# Patient Record
Sex: FEMALE | Race: WHITE | URBAN - METROPOLITAN AREA
[De-identification: names, ages, dates, MRNs, and addresses within clinical notes are randomized per-mention and may not be internally consistent; named-entity substitution may affect disease eponyms.]

---

## 2018-07-01 ENCOUNTER — ED HISTORICAL/CONVERTED ENCOUNTER (OUTPATIENT)
Dept: OTHER | Age: 28
End: 2018-07-01

## 2021-10-28 ENCOUNTER — APPOINTMENT (OUTPATIENT)
Dept: CT IMAGING | Age: 31
DRG: 917 | End: 2021-10-28
Attending: EMERGENCY MEDICINE
Payer: COMMERCIAL

## 2021-10-28 ENCOUNTER — HOSPITAL ENCOUNTER (INPATIENT)
Age: 31
LOS: 2 days | Discharge: HOME | DRG: 917 | End: 2021-10-30
Attending: EMERGENCY MEDICINE | Admitting: INTERNAL MEDICINE
Payer: COMMERCIAL

## 2021-10-28 DIAGNOSIS — G93.40 ACUTE ENCEPHALOPATHY: ICD-10-CM

## 2021-10-28 DIAGNOSIS — N30.00 ACUTE CYSTITIS WITHOUT HEMATURIA: ICD-10-CM

## 2021-10-28 DIAGNOSIS — R41.3 AMNESIA MEMORY LOSS: Primary | ICD-10-CM

## 2021-10-28 DIAGNOSIS — R40.4 TRANSIENT ALTERATION OF AWARENESS: ICD-10-CM

## 2021-10-28 LAB
ALBUMIN SERPL-MCNC: 4 G/DL (ref 3.5–5)
ALBUMIN/GLOB SERPL: 0.9 {RATIO} (ref 1.1–2.2)
ALP SERPL-CCNC: 76 U/L (ref 45–117)
ALT SERPL-CCNC: 21 U/L (ref 12–78)
AMPHET UR QL SCN: NEGATIVE
ANION GAP SERPL CALC-SCNC: 4 MMOL/L (ref 5–15)
APAP SERPL-MCNC: <2 UG/ML (ref 10–30)
APPEARANCE UR: CLEAR
AST SERPL-CCNC: 9 U/L (ref 15–37)
BACTERIA URNS QL MICRO: ABNORMAL /HPF
BARBITURATES UR QL SCN: NEGATIVE
BASOPHILS # BLD: 0.1 K/UL (ref 0–0.1)
BASOPHILS NFR BLD: 1 % (ref 0–1)
BENZODIAZ UR QL: NEGATIVE
BILIRUB SERPL-MCNC: 0.5 MG/DL (ref 0.2–1)
BILIRUB UR QL: NEGATIVE
BUN SERPL-MCNC: 9 MG/DL (ref 6–20)
BUN/CREAT SERPL: 10 (ref 12–20)
CALCIUM SERPL-MCNC: 9.4 MG/DL (ref 8.5–10.1)
CANNABINOIDS UR QL SCN: POSITIVE
CHLORIDE SERPL-SCNC: 106 MMOL/L (ref 97–108)
CO2 SERPL-SCNC: 25 MMOL/L (ref 21–32)
COCAINE UR QL SCN: NEGATIVE
COLOR UR: ABNORMAL
COMMENT, HOLDF: NORMAL
CREAT SERPL-MCNC: 0.93 MG/DL (ref 0.55–1.02)
DIFFERENTIAL METHOD BLD: ABNORMAL
DRUG SCRN COMMENT,DRGCM: ABNORMAL
EOSINOPHIL # BLD: 0 K/UL (ref 0–0.4)
EOSINOPHIL NFR BLD: 0 % (ref 0–7)
EPITH CASTS URNS QL MICRO: ABNORMAL /LPF
ERYTHROCYTE [DISTWIDTH] IN BLOOD BY AUTOMATED COUNT: 12.6 % (ref 11.5–14.5)
ETHANOL SERPL-MCNC: <10 MG/DL
GLOBULIN SER CALC-MCNC: 4.3 G/DL (ref 2–4)
GLUCOSE SERPL-MCNC: 100 MG/DL (ref 65–100)
GLUCOSE UR STRIP.AUTO-MCNC: NEGATIVE MG/DL
HCT VFR BLD AUTO: 41.2 % (ref 35–47)
HGB BLD-MCNC: 13.6 G/DL (ref 11.5–16)
HGB UR QL STRIP: NEGATIVE
IMM GRANULOCYTES # BLD AUTO: 0 K/UL (ref 0–0.04)
IMM GRANULOCYTES NFR BLD AUTO: 0 % (ref 0–0.5)
KETONES UR QL STRIP.AUTO: 80 MG/DL
LEUKOCYTE ESTERASE UR QL STRIP.AUTO: NEGATIVE
LYMPHOCYTES # BLD: 2.4 K/UL (ref 0.8–3.5)
LYMPHOCYTES NFR BLD: 18 % (ref 12–49)
MCH RBC QN AUTO: 29.2 PG (ref 26–34)
MCHC RBC AUTO-ENTMCNC: 33 G/DL (ref 30–36.5)
MCV RBC AUTO: 88.6 FL (ref 80–99)
METHADONE UR QL: NEGATIVE
MONOCYTES # BLD: 0.9 K/UL (ref 0–1)
MONOCYTES NFR BLD: 7 % (ref 5–13)
NEUTS SEG # BLD: 9.4 K/UL (ref 1.8–8)
NEUTS SEG NFR BLD: 74 % (ref 32–75)
NITRITE UR QL STRIP.AUTO: POSITIVE
NRBC # BLD: 0 K/UL (ref 0–0.01)
NRBC BLD-RTO: 0 PER 100 WBC
OPIATES UR QL: NEGATIVE
PCP UR QL: NEGATIVE
PH UR STRIP: 7 [PH] (ref 5–8)
PLATELET # BLD AUTO: 352 K/UL (ref 150–400)
PMV BLD AUTO: 9.8 FL (ref 8.9–12.9)
POTASSIUM SERPL-SCNC: 3.5 MMOL/L (ref 3.5–5.1)
PROT SERPL-MCNC: 8.3 G/DL (ref 6.4–8.2)
PROT UR STRIP-MCNC: NEGATIVE MG/DL
RBC # BLD AUTO: 4.65 M/UL (ref 3.8–5.2)
RBC #/AREA URNS HPF: ABNORMAL /HPF (ref 0–5)
SALICYLATES SERPL-MCNC: <1.7 MG/DL (ref 2.8–20)
SAMPLES BEING HELD,HOLD: NORMAL
SODIUM SERPL-SCNC: 135 MMOL/L (ref 136–145)
SP GR UR REFRACTOMETRY: 1.02 (ref 1–1.03)
UR CULT HOLD, URHOLD: NORMAL
UROBILINOGEN UR QL STRIP.AUTO: 0.2 EU/DL (ref 0.2–1)
WBC # BLD AUTO: 12.8 K/UL (ref 3.6–11)
WBC URNS QL MICRO: ABNORMAL /HPF (ref 0–4)

## 2021-10-28 PROCEDURE — 99285 EMERGENCY DEPT VISIT HI MDM: CPT

## 2021-10-28 PROCEDURE — 87086 URINE CULTURE/COLONY COUNT: CPT

## 2021-10-28 PROCEDURE — 74011250637 HC RX REV CODE- 250/637: Performed by: EMERGENCY MEDICINE

## 2021-10-28 PROCEDURE — 65660000000 HC RM CCU STEPDOWN

## 2021-10-28 PROCEDURE — 80179 DRUG ASSAY SALICYLATE: CPT

## 2021-10-28 PROCEDURE — 70450 CT HEAD/BRAIN W/O DYE: CPT

## 2021-10-28 PROCEDURE — 80143 DRUG ASSAY ACETAMINOPHEN: CPT

## 2021-10-28 PROCEDURE — 36415 COLL VENOUS BLD VENIPUNCTURE: CPT

## 2021-10-28 PROCEDURE — 80053 COMPREHEN METABOLIC PANEL: CPT

## 2021-10-28 PROCEDURE — 82077 ASSAY SPEC XCP UR&BREATH IA: CPT

## 2021-10-28 PROCEDURE — 87077 CULTURE AEROBIC IDENTIFY: CPT

## 2021-10-28 PROCEDURE — 85025 COMPLETE CBC W/AUTO DIFF WBC: CPT

## 2021-10-28 PROCEDURE — 87186 SC STD MICRODIL/AGAR DIL: CPT

## 2021-10-28 PROCEDURE — 81001 URINALYSIS AUTO W/SCOPE: CPT

## 2021-10-28 PROCEDURE — 80307 DRUG TEST PRSMV CHEM ANLYZR: CPT

## 2021-10-28 RX ORDER — ONDANSETRON 2 MG/ML
4 INJECTION INTRAMUSCULAR; INTRAVENOUS
Status: DISCONTINUED | OUTPATIENT
Start: 2021-10-28 | End: 2021-10-30 | Stop reason: HOSPADM

## 2021-10-28 RX ORDER — SODIUM CHLORIDE, SODIUM LACTATE, POTASSIUM CHLORIDE, CALCIUM CHLORIDE 600; 310; 30; 20 MG/100ML; MG/100ML; MG/100ML; MG/100ML
50 INJECTION, SOLUTION INTRAVENOUS CONTINUOUS
Status: DISCONTINUED | OUTPATIENT
Start: 2021-10-28 | End: 2021-10-30 | Stop reason: HOSPADM

## 2021-10-28 RX ORDER — NITROFURANTOIN 25; 75 MG/1; MG/1
100 CAPSULE ORAL
Status: COMPLETED | OUTPATIENT
Start: 2021-10-28 | End: 2021-10-28

## 2021-10-28 RX ORDER — ACETAMINOPHEN 650 MG/1
650 SUPPOSITORY RECTAL
Status: DISCONTINUED | OUTPATIENT
Start: 2021-10-28 | End: 2021-10-30 | Stop reason: HOSPADM

## 2021-10-28 RX ORDER — POLYETHYLENE GLYCOL 3350 17 G/17G
17 POWDER, FOR SOLUTION ORAL DAILY PRN
Status: DISCONTINUED | OUTPATIENT
Start: 2021-10-28 | End: 2021-10-30 | Stop reason: HOSPADM

## 2021-10-28 RX ORDER — ONDANSETRON 4 MG/1
4 TABLET, ORALLY DISINTEGRATING ORAL
Status: DISCONTINUED | OUTPATIENT
Start: 2021-10-28 | End: 2021-10-30 | Stop reason: HOSPADM

## 2021-10-28 RX ORDER — ACETAMINOPHEN 325 MG/1
650 TABLET ORAL
Status: DISCONTINUED | OUTPATIENT
Start: 2021-10-28 | End: 2021-10-30 | Stop reason: HOSPADM

## 2021-10-28 RX ORDER — SODIUM CHLORIDE 0.9 % (FLUSH) 0.9 %
5-40 SYRINGE (ML) INJECTION EVERY 8 HOURS
Status: DISCONTINUED | OUTPATIENT
Start: 2021-10-28 | End: 2021-10-30 | Stop reason: HOSPADM

## 2021-10-28 RX ORDER — SODIUM CHLORIDE 0.9 % (FLUSH) 0.9 %
5-40 SYRINGE (ML) INJECTION AS NEEDED
Status: DISCONTINUED | OUTPATIENT
Start: 2021-10-28 | End: 2021-10-30 | Stop reason: HOSPADM

## 2021-10-28 RX ADMIN — Medication 10 ML: at 23:02

## 2021-10-28 RX ADMIN — NITROFURANTOIN (MONOHYDRATE/MACROCRYSTALS) 100 MG: 75; 25 CAPSULE ORAL at 19:21

## 2021-10-28 NOTE — BSMART NOTE
Patient arrived here by squad for complaints of memory loss. Patient knows her name but does not know exactly where she lives, what she does for a living, if she has children, or if she is . Per triage she was aware of month and year. Patient does not know who called the ambulance. ER Doctor spoke with patient's boyfriend, Hiro Cortes, at [phone]. He indicated she has history of UTI and low iron. He reported she began having difficulty with memory yesterday and came home from work unable to remember what she did. She told her boyfriend she got home with help of GPS. Per boyfriend there has been increased work stress. Patient is alert and oriented to person but not place, time, or situation entirely. Patient was asked where she lived and she said \"I have a cheat sheet in my pocket. \"  She could only recall [place]. Patient is not sure if she has ever had this happen before and stated she doesn't know if she has any history of drug use/abuse. Patient reported she doesn't think she has mental health issues but isn't sure. Patient stated she cant recall if she is eating or sleeping but feels hungry. She denied hallucinations. Patient also denied adamantly any suicidal or homicidal ideation or history of same. Patient reported she is employed but not sure in what capacity and unsure if she is  or if she has children. Patient is unsure of her level of education but reported \"[employer] is popping up in my head. \"      Discussed case with Dr Patricia Ashby who advised so far no medical cause for the memory loss. Mom is reportedly coming here from [address] and coming to hospital.  Will speak with her on arrival.    Mother has arrived. She stated that 2 days ago patient was in her normal state of health but was anxious about a conversation she needed to have with her boyfriend of 11 years and if it didn't go the way she expected possibly considering a break up.   Mom is unsure if this conversation happened as is patient. Patient has always been anxious per mother but not treated for any mental health conditions. Patient recalls mother and other family members in talking with mother. UDS is positive for THC. Per Dr Tito Munoz other than UTI, patient is medically cleared. This writer spoke with Kaiden Etienne NP for Psychiatry who does not recommend psychiatric admission at this time. She recommends neurological consult and admission if appropriate with psychiatric consult. [place] Suicide Scale No risk per patient.

## 2021-10-28 NOTE — ED TRIAGE NOTES
Pt arrives via ems with c/o memory loss. Pt is having trouble with short term memory and some long term memories. Is able to state name, state, month, year. Does not know answers to many questions.

## 2021-10-28 NOTE — ED PROVIDER NOTES
The history is provided by the patient. Memory Loss   This is a new problem. The current episode started yesterday. The problem has been gradually worsening. No past medical history on file. No past surgical history on file. No family history on file. Social History     Socioeconomic History    Marital status: SINGLE     Spouse name: Not on file    Number of children: Not on file    Years of education: Not on file    Highest education level: Not on file   Occupational History    Not on file   Tobacco Use    Smoking status: Not on file   Substance and Sexual Activity    Alcohol use: Not on file    Drug use: Not on file    Sexual activity: Not on file   Other Topics Concern    Not on file   Social History Narrative    Not on file     Social Determinants of Health     Financial Resource Strain:     Difficulty of Paying Living Expenses:    Food Insecurity:     Worried About Running Out of Food in the Last Year:     [address] in the Last Year:    Transportation Needs:     Lack of Transportation (Medical):  Lack of Transportation (Non-Medical):    Physical Activity:     Days of Exercise per Week:     Minutes of Exercise per Session:    Stress:     Feeling of Stress :    Social Connections:     Frequency of Communication with Friends and Family:     Frequency of Social Gatherings with Friends and Family:     Attends [employer] Services:     Active Member of Clubs or Organizations:     Attends Club or Organization Meetings:     Marital Status:    Intimate Partner Violence:     Fear of Current or Ex-Partner:     Emotionally Abused:     Physically Abused:     Sexually Abused: ALLERGIES: Patient has no known allergies. Review of Systems   Unable to perform ROS: Mental status change   Psychiatric/Behavioral: Positive for memory loss.        Vitals:    10/28/21 1600   BP: (!) 148/104   Pulse: 93   Resp: 16   Temp: 99 °F (37.2 °C)   SpO2: 99%   Weight: 83.9 kg (184 lb 15.5 oz)            Physical Exam  Vitals and nursing note reviewed. Constitutional:       General: She is not in acute distress. Appearance: She is well-developed. She is not diaphoretic. HENT:      Head: Atraumatic. Neck:      Trachea: No tracheal deviation. Cardiovascular:      Comments: Warm and well perfused  Pulmonary:      Effort: Pulmonary effort is normal. No respiratory distress. Musculoskeletal:         General: Normal range of motion. Skin:     General: Skin is warm and dry. Neurological:      Mental Status: She is alert. Coordination: Coordination normal.   Psychiatric:         Behavior: Behavior normal.         Thought Content: Thought content normal.         Cognition and Memory: Memory is impaired. Judgment: Judgment normal.          MDM     This is a 18-year-old female with past medical history, review of systems, physical exam as above, presenting with complaints of amnesia. Patient without family at bedside. 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 She thinks that memory problems developed yesterday. She has a very limited review of systems and recall. 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 She is unclear as to whether she uses tobacco, alcohol or drugs. Chart reviewed without significant past medical history. Physical exam is remarkable for well-appearing young female, in no acute distress with a nonfocal neurologic exam, clear breath sounds, regular rate and rhythm without murmurs gallops or rubs. She is noted to be mildly hypertensive, afebrile without tachycardia, satting well on room air. She is under the impression her boyfriend followed the ambulance to the emergency department.   We will start with basic lab work, UA, UDS, acetaminophen, salicylates, blood alcohol and head CT. We will attempt to gather additional information, disposition pending. Procedures    Perfect Serve Consult for Admission  7:14 PM    ED Room Number: ER22/22  Patient Name and age:  Chris Benton 27 y.o.  female  Working Diagnosis:   1. Amnesia memory loss    2.  Acute cystitis without hematuria        COVID-19 Suspicion:  no  Sepsis present:  no  Reassessment needed: no  Code Status:  Full Code  Readmission: no  Isolation Requirements:  no  Recommended Level of Care:  med/surg  Department:[facility] Adult ED - 21   Other:  D/w Dr. Crispin Hunt

## 2021-10-29 ENCOUNTER — APPOINTMENT (OUTPATIENT)
Dept: GENERAL RADIOLOGY | Age: 31
DRG: 917 | End: 2021-10-29
Attending: INTERNAL MEDICINE
Payer: COMMERCIAL

## 2021-10-29 ENCOUNTER — APPOINTMENT (OUTPATIENT)
Dept: MRI IMAGING | Age: 31
DRG: 917 | End: 2021-10-29
Attending: INTERNAL MEDICINE
Payer: COMMERCIAL

## 2021-10-29 PROBLEM — G93.40 ACUTE ENCEPHALOPATHY: Status: ACTIVE | Noted: 2021-10-28

## 2021-10-29 LAB
ALBUMIN SERPL-MCNC: 3.6 G/DL (ref 3.5–5)
ALBUMIN/GLOB SERPL: 1 {RATIO} (ref 1.1–2.2)
ALP SERPL-CCNC: 69 U/L (ref 45–117)
ALT SERPL-CCNC: 17 U/L (ref 12–78)
ANION GAP SERPL CALC-SCNC: 4 MMOL/L (ref 5–15)
AST SERPL-CCNC: 8 U/L (ref 15–37)
BASOPHILS # BLD: 0 K/UL (ref 0–0.1)
BASOPHILS NFR BLD: 0 % (ref 0–1)
BILIRUB SERPL-MCNC: 0.3 MG/DL (ref 0.2–1)
BUN SERPL-MCNC: 8 MG/DL (ref 6–20)
BUN/CREAT SERPL: 13 (ref 12–20)
CALCIUM SERPL-MCNC: 9.3 MG/DL (ref 8.5–10.1)
CHLORIDE SERPL-SCNC: 109 MMOL/L (ref 97–108)
CO2 SERPL-SCNC: 24 MMOL/L (ref 21–32)
CREAT SERPL-MCNC: 0.61 MG/DL (ref 0.55–1.02)
CRP SERPL-MCNC: 0.59 MG/DL (ref 0–0.6)
D DIMER PPP FEU-MCNC: <0.19 MG/L FEU (ref 0–0.65)
DIFFERENTIAL METHOD BLD: ABNORMAL
EOSINOPHIL # BLD: 0.1 K/UL (ref 0–0.4)
EOSINOPHIL NFR BLD: 0 % (ref 0–7)
ERYTHROCYTE [DISTWIDTH] IN BLOOD BY AUTOMATED COUNT: 13 % (ref 11.5–14.5)
GLOBULIN SER CALC-MCNC: 3.5 G/DL (ref 2–4)
GLUCOSE SERPL-MCNC: 83 MG/DL (ref 65–100)
HCT VFR BLD AUTO: 37.8 % (ref 35–47)
HGB BLD-MCNC: 12.9 G/DL (ref 11.5–16)
IMM GRANULOCYTES # BLD AUTO: 0 K/UL (ref 0–0.04)
IMM GRANULOCYTES NFR BLD AUTO: 0 % (ref 0–0.5)
LACTATE SERPL-SCNC: 0.9 MMOL/L (ref 0.4–2)
LYMPHOCYTES # BLD: 2.8 K/UL (ref 0.8–3.5)
LYMPHOCYTES NFR BLD: 25 % (ref 12–49)
MAGNESIUM SERPL-MCNC: 2.3 MG/DL (ref 1.6–2.4)
MCH RBC QN AUTO: 30.1 PG (ref 26–34)
MCHC RBC AUTO-ENTMCNC: 34.1 G/DL (ref 30–36.5)
MCV RBC AUTO: 88.3 FL (ref 80–99)
MONOCYTES # BLD: 0.8 K/UL (ref 0–1)
MONOCYTES NFR BLD: 7 % (ref 5–13)
NEUTS SEG # BLD: 7.4 K/UL (ref 1.8–8)
NEUTS SEG NFR BLD: 68 % (ref 32–75)
NRBC # BLD: 0 K/UL (ref 0–0.01)
NRBC BLD-RTO: 0 PER 100 WBC
PHOSPHATE SERPL-MCNC: 3.9 MG/DL (ref 2.6–4.7)
PLATELET # BLD AUTO: 338 K/UL (ref 150–400)
PMV BLD AUTO: 10.1 FL (ref 8.9–12.9)
POTASSIUM SERPL-SCNC: 4.3 MMOL/L (ref 3.5–5.1)
PROT SERPL-MCNC: 7.1 G/DL (ref 6.4–8.2)
RBC # BLD AUTO: 4.28 M/UL (ref 3.8–5.2)
SODIUM SERPL-SCNC: 137 MMOL/L (ref 136–145)
TSH SERPL DL<=0.05 MIU/L-ACNC: 3.36 UIU/ML (ref 0.36–3.74)
WBC # BLD AUTO: 11.1 K/UL (ref 3.6–11)

## 2021-10-29 PROCEDURE — 86140 C-REACTIVE PROTEIN: CPT

## 2021-10-29 PROCEDURE — 85025 COMPLETE CBC W/AUTO DIFF WBC: CPT

## 2021-10-29 PROCEDURE — 95816 EEG AWAKE AND DROWSY: CPT | Performed by: PSYCHIATRY & NEUROLOGY

## 2021-10-29 PROCEDURE — 65660000000 HC RM CCU STEPDOWN

## 2021-10-29 PROCEDURE — 84443 ASSAY THYROID STIM HORMONE: CPT

## 2021-10-29 PROCEDURE — 84100 ASSAY OF PHOSPHORUS: CPT

## 2021-10-29 PROCEDURE — 74011250636 HC RX REV CODE- 250/636: Performed by: INTERNAL MEDICINE

## 2021-10-29 PROCEDURE — 99254 IP/OBS CNSLTJ NEW/EST MOD 60: CPT | Performed by: PSYCHIATRY & NEUROLOGY

## 2021-10-29 PROCEDURE — 74011250637 HC RX REV CODE- 250/637: Performed by: INTERNAL MEDICINE

## 2021-10-29 PROCEDURE — 83735 ASSAY OF MAGNESIUM: CPT

## 2021-10-29 PROCEDURE — 85379 FIBRIN DEGRADATION QUANT: CPT

## 2021-10-29 PROCEDURE — 83605 ASSAY OF LACTIC ACID: CPT

## 2021-10-29 PROCEDURE — 80053 COMPREHEN METABOLIC PANEL: CPT

## 2021-10-29 PROCEDURE — 70551 MRI BRAIN STEM W/O DYE: CPT

## 2021-10-29 PROCEDURE — 71045 X-RAY EXAM CHEST 1 VIEW: CPT

## 2021-10-29 PROCEDURE — 36415 COLL VENOUS BLD VENIPUNCTURE: CPT

## 2021-10-29 PROCEDURE — 74011000250 HC RX REV CODE- 250: Performed by: INTERNAL MEDICINE

## 2021-10-29 RX ADMIN — SODIUM CHLORIDE, POTASSIUM CHLORIDE, SODIUM LACTATE AND CALCIUM CHLORIDE 125 ML/HR: 600; 310; 30; 20 INJECTION, SOLUTION INTRAVENOUS at 00:25

## 2021-10-29 RX ADMIN — SODIUM CHLORIDE, POTASSIUM CHLORIDE, SODIUM LACTATE AND CALCIUM CHLORIDE 125 ML/HR: 600; 310; 30; 20 INJECTION, SOLUTION INTRAVENOUS at 10:15

## 2021-10-29 RX ADMIN — Medication 10 ML: at 06:00

## 2021-10-29 RX ADMIN — CEFTRIAXONE SODIUM 1 G: 1 INJECTION, POWDER, FOR SOLUTION INTRAMUSCULAR; INTRAVENOUS at 00:18

## 2021-10-29 RX ADMIN — Medication 10 ML: at 17:44

## 2021-10-29 RX ADMIN — Medication 1 CAPSULE: at 10:04

## 2021-10-29 RX ADMIN — CEFTRIAXONE SODIUM 1 G: 1 INJECTION, POWDER, FOR SOLUTION INTRAMUSCULAR; INTRAVENOUS at 23:32

## 2021-10-29 RX ADMIN — Medication 10 ML: at 22:00

## 2021-10-29 NOTE — ED NOTES
Pt mother, Katerine Hernandes, left her number, [phone], to be called with any updates or pt needs.

## 2021-10-29 NOTE — ED NOTES
TRANSFER - OUT REPORT:    Verbal report given to Ector Whalen RN(name) on [facility]  being transferred to 6S NSTU(unit) for routine progression of care       Report consisted of patients Situation, Background, Assessment and   Recommendations(SBAR). Information from the following report(s) SBAR was reviewed with the receiving nurse. Lines:   Peripheral IV 10/28/21 Right; Anterior; Outer (Active)        Opportunity for questions and clarification was provided.       Patient transported with:   [employer]

## 2021-10-29 NOTE — PROGRESS NOTES
Problem: Altered Thought Process (Adult/Pediatric)  Goal: Interventions  Outcome: Progressing Towards Goal     Problem: Patient Education: Go to Patient Education Activity  Goal: Patient/Family Education  Outcome: Progressing Towards Goal

## 2021-10-29 NOTE — H&P
[address]  HISTORY AND PHYSICAL    Name:  Moses Lizarraga  MR#:  [id number]  :  1990  ACCOUNT #:  [de-identified]  ADMIT DATE:  10/28/2021      The patient was seen, evaluated and admitted by me on 10/28/2021. PRIMARY CARE PHYSICIAN:  None. SOURCE OF INFORMATION:  The patient who is not a good historian because of memory problems. Review of ED electronic medical records. CHIEF COMPLAINT:  Memory loss. HISTORY OF PRESENT ILLNESS:  This is a 26-year-old woman with no significant past medical history who was in her usual state of health until the day of her presentation at the emergency room when the patient developed sudden onset of memory loss. The patient is unable to state time and day of the month. The patient is not able to answer simple questions. The patient kept saying \"I don't remember\" to every question. There was no history of fever, no rigors, no chills. No record of prior admission to this hospital.  CT scan of the head done on arrival at the emergency room was negative for acute pathology. The patient was subsequently referred to the hospitalist service for evaluation for admission. It is not clear whether this patient has had similar episodes in the past since she is not able to provide any history and also it is not clear whether the patient has history of seizure disorder. PAST MEDICAL HISTORY:  Not significant. ALLERGIES:  NO KNOWN DRUG ALLERGIES. MEDICATIONS:  No home medication reported. FAMILY HISTORY:  Unable to obtain because of the patient's memory loss. PAST SURGICAL HISTORY:  No surgery in the past was reported. SOCIAL HISTORY:  There was no history of alcohol or tobacco abuse reported. REVIEW OF SYSTEMS:  Unable to obtain because of the patient's memory loss. PHYSICAL EXAMINATION:  GENERAL APPEARANCE:  The patient appeared ill, in moderate distress.   VITAL SIGNS:  On arrival at the emergency room; temperature 99, pulse 93, respiratory rate 16, blood pressure 148/104, oxygen saturation 99% on room air. HEENT:  Head:  Normocephalic, atraumatic. Eyes:  Normal eye movement. No redness, no drainage, no discharge. Ears:  Normal external ears with no evidence of drainage. Nose:  No deformity, no drainage. Mouth and Throat:  No visible oral lesion. NECK:  Neck is supple. No JVD, no thyromegaly. CHEST:  Clear breath sounds. No wheezing, no crackles. HEART:  Normal S1 and S2, regular. No clinically appreciable murmur. ABDOMEN:  Soft, nontender. Normal bowel sounds. CNS:  Alert. Not oriented. No gross focal neurological deficit. EXTREMITIES:  No edema. Pulses 2+ bilaterally. MUSCULOSKELETAL SYSTEM:  No evidence of joint deformity or swelling. SKIN:  No active skin lesions seen in the exposed part of the body. PSYCHIATRY:  Unable to assess mood and affect. LYMPHATIC SYSTEM:  No cervical lymphadenopathy. DIAGNOSTIC DATA:  CT scan of the head without contrast negative. LABORATORY DATA:  Hematology:  WBC 12.8, hemoglobin 13.6, hematocrit 41.2, platelets 072. Acetaminophen level less than 2, serum alcohol level less than 10, salicylate level less than 1.7. Chemistry:  Sodium 135, potassium 3.5, chloride 106, CO2 of 25, glucose 100, BUN 9, creatinine 0.93, calcium 9.4, total bilirubin 0.5, ALT 21, AST 9, alkaline phosphatase 76, total protein 8.3, albumin level 4.0, globulin 4.3. Urinalysis: This is significant for positive nitrite, negative leukocyte esterase, 1+ bacteria. Urine drug screen: This is positive for THC. ASSESSMENT:  1. Amnesia memory loss. 2.  Marijuana use. 3.  Elevated blood pressure. 4.  Acute cystitis without hematuria. 5.  Leukocytosis. PLAN:  1. Amnesia memory loss: We will admit the patient for further evaluation and treatment. We will obtain MRI of the brain to rule out acute pathology. Neurology consult will be requested to assist in further evaluation and treatment.   2. Marijuana use: The patient advised to quit. We will continue supportive treatment. 3.  Elevated blood pressure: We will monitor the patient's blood pressure closely. We will check a TSH level. It is not clear whether the patient has history of hypertension or not. If average blood pressure reading remains elevated, the patient may require further evaluation for hypertension and discharge to home on oral antihypertensive medication. 4.  Acute cystitis without hematuria: We will start the patient on Rocephin. The patient's acute cystitis is not significant enough to cause change in mental status. We will await urine culture. 5.  Leukocytosis:  The patient is not toxic. Afebrile. We will check lactic acid level. We will obtain chest x-ray. OTHER ISSUES:  Code status: The patient is a full code. We will request SCD for DVT prophylaxis. FUNCTIONAL STATUS PRIOR TO ADMISSION:  The patient came from home. The patient is ambulatory with no assistive device. COVID PRECAUTION:  The patient was wearing a face mask. I was wearing a face mask and gloves for this patient's encounter.         Lena Ring MD      [id number]  D:  10/29/2021 6:29  T:  10/29/2021 7:49  JOB #:  [id number]

## 2021-10-29 NOTE — PROGRESS NOTES
[address] [facility] Adult  Hospitalist Group                                                                                          Hospitalist Progress Note  Dee Quintanilla MD  Answering service: [phone] from in house phone        Date of Service:  10/29/2021  NAME:  Hua Powell  :  1990  MRN:  [id number]      Admission Summary:   Acute onset of memory loss, positive for thc, pt endorses going to a party eating marijuana. UA suspicious for uti and head CT negative for acute process. Interval history / Subjective:   Pt awake states that some of her memory is returning. When asked regarding the drug screen results, pt states that she had gone to a party where she ate the marijuana. Assessment & Plan:     Acute onset of memory loss, positive for thc, pt endorses going to a party eating marijuana    Amnesia memory loss in setting of recent marjuana  Pt states that some of her memory is coming back now  -positive for THC and uti  -head CT negative for acute process  -f/u with neurology    Mild leukocytosis, trending down  No fever/chills  On abx for uti  Trend    Elevated bp on admission, no prior htn history  Improved, trend    Marijuana use  Pt reports used for recreational use, counselled    Acute cystitis without hematuria. tx with abx    DVT prophylaxis: scds, pt ambulates independently  Code: full    Dispo: pending workup by neurology service       Hospital Problems  Date Reviewed: 10/28/2021        Codes Class Noted POA    * (Principal) Acute encephalopathy ICD-10-CM: G93.40  ICD-9-CM: 348.30  10/28/2021 Yes                Review of Systems:   No report of subjective fever/chills/headache/n/v/abd pain/paresthesia/unilateral weakness/vision changes/slurring speech/change in voice/etc. 10 point ROS negative other than noted above      Vital Signs:    Last 24hrs VS reviewed since prior progress note.  Most recent are:  Visit Vitals  /84 (BP 1 Location: Left upper arm, BP Patient Position: At rest;Lying)   Pulse 88   Temp 98.2 °F (36.8 °C)   Resp 19   Wt 83.5 kg (184 lb)   SpO2 99%       No intake or output data in the 24 hours ending 10/29/21 1311     Physical Examination:     I had a face to face encounter with this patient and independently examined them on 10/29/2021 as outlined below:          Constitutional:  No acute distress, cooperative, pleasant   Eyes: anicteric   ENT:  Oral mucosa moist, oropharynx benign. Resp:  CTA bilaterally. No wheezing/rhonchi/rales. Chest: No accessory muscle use   CV:  Regular rhythm, normal rate, no rubs    GI:  Soft, non distended, non tender. normoactive bowel sounds    Musculoskeletal:  No edema, warm, 2+ pulses throughout  Skin: warm and dry, no cyanosis    Neurologic:  Moves all extremities.   AAOx3             Data Review:       Labs:     Recent Labs     10/29/21  0336 10/28/21  1624   WBC 11.1* 12.8*   HGB 12.9 13.6   HCT 37.8 41.2    352     Recent Labs     10/29/21  0336 10/28/21  1624    135*   K 4.3 3.5   * 106   CO2 24 25   BUN 8 9   CREA 0.61 0.93   GLU 83 100   CA 9.3 9.4   MG 2.3  --    PHOS 3.9  --      Recent Labs     10/29/21  0336 10/28/21  1624   ALT 17 21   AP 69 76   TBILI 0.3 0.5   TP 7.1 8.3*   ALB 3.6 4.0   GLOB 3.5 4.3*       Lab Results   Component Value Date/Time    Color YELLOW/STRAW 10/28/2021 05:29 PM    Appearance CLEAR 10/28/2021 05:29 PM    Specific gravity 1.019 10/28/2021 05:29 PM    pH (UA) 7.0 10/28/2021 05:29 PM    Protein Negative 10/28/2021 05:29 PM    Glucose Negative 10/28/2021 05:29 PM    Ketone 80 (A) 10/28/2021 05:29 PM    Bilirubin Negative 10/28/2021 05:29 PM    Urobilinogen 0.2 10/28/2021 05:29 PM    Nitrites Positive (A) 10/28/2021 05:29 PM    Leukocyte Esterase Negative 10/28/2021 05:29 PM    Epithelial cells FEW 10/28/2021 05:29 PM    Bacteria 1+ (A) 10/28/2021 05:29 PM    WBC 0-4 10/28/2021 05:29 PM    RBC 0-5 10/28/2021 05:29 PM         Medications Reviewed:     Current Facility-Administered Medications   Medication Dose Route Frequency    sodium chloride (NS) flush 5-40 mL  5-40 mL IntraVENous Q8H    sodium chloride (NS) flush 5-40 mL  5-40 mL IntraVENous PRN    acetaminophen (TYLENOL) tablet 650 mg  650 mg Oral Q6H PRN    Or    acetaminophen (TYLENOL) suppository 650 mg  650 mg Rectal Q6H PRN    polyethylene glycol (MIRALAX) packet 17 g  17 g Oral DAILY PRN    ondansetron (ZOFRAN ODT) tablet 4 mg  4 mg Oral Q8H PRN    Or    ondansetron (ZOFRAN) injection 4 mg  4 mg IntraVENous Q6H PRN    lactated Ringers infusion  50 mL/hr IntraVENous CONTINUOUS    L.acidophilus-paracasei-S.thermophil-bifidobacter (RISAQUAD) 8 billion cell capsule  1 Capsule Oral DAILY    cefTRIAXone (ROCEPHIN) 1 g in sterile water (preservative free) 10 mL IV syringe  1 g IntraVENous Q24H     ______________________________________________________________________  EXPECTED LENGTH OF STAY: - - -  ACTUAL LENGTH OF STAY:          1                 Alexis Stein MD

## 2021-10-29 NOTE — PROGRESS NOTES
Problem: Altered Thought Process (Adult/Pediatric)  Goal: Interventions  Outcome: Progressing Towards Goal     Problem: Patient Education: Go to Patient Education Activity  Goal: Patient/Family Education  Outcome: Progressing Towards Goal     Problem: Discharge Planning  Goal: *Discharge to safe environment  Outcome: Progressing Towards Goal

## 2021-10-29 NOTE — PROGRESS NOTES
Patient stated that her memory has come back. I asked if she was experiencing stress in the work place or at home and she stated she was experiencing stress at home. I asked her to elaborate and she shared with me that she and her boyfriend are arguing more. I asked if there was any verbal or physical abuse. She hesitated and then stated, \"no. \"  I told her that if there was anything that she felt she needed to talk about, that we were hear to listen and help her. She responded by saying, \"ok. \"

## 2021-10-29 NOTE — CONSULTS
NEUROLOGY CONSULT NOTE    Name Flora Gilman Age 27 y.o. MRN [id number]  1990     Consulting Physician: Giorgio Sanchez MD      Chief Complaint: AMS     Assessment:     Principal Problem:    Acute encephalopathy (10/28/2021)      27year old female without significant medical history admitted with acute encephalopathy x 48 hours which appears improved presently. Evidence of UTI this admission and recent exposure to substances at a party which she ingested. She believes this was edible THC. Toxicology screen appears positive for the same. In addition to above, she has also had some recent stress/anxiety related to relationship/job stressors. Examination appears non-focal presently. We will complete MRI Brain to exclude any acute structural pathology as well as baseline EEG for assessment of epileptiform activity, although I suspect toxic/metabolic encephalopathy which is resolving. Will f/u studies once completed. If no significant findings, anticipate discharge home. Recommendations:   MRI Brain WO  Routine EEG    Thank you very much for this referral. I appreciate the opportunity to participate in this patient's care. History of Present Illness: This is a 27 y. o.female, we were asked to see for AMS. Patient tells me she was having difficulty with recall over the past 24-48 hours. She had trouble remembering dates, recognizing family members, recalling her address. She denies similar symptoms in the past. She admits to recent stressors. Denies h/o psychiatric diagnosis. She was seen by behavioral health who received information from patient's mother that patient has been having recent relationship stress. Labs were notable for positive UA currently on ceftriaxone and toxicology screen positive for THC. She tells me today that she ate a \"pot brownie\" 2 days ago at a party. She denies using THC previously or any illicit substances. She denies excessive ETOH intake.  Today, she feels symptoms are improved. CT head did not reveal any acute intracranial pathology. No Known Allergies     Prior to Admission medications    Not on File       Social History     Tobacco Use    Smoking status: Not on file   Substance Use Topics    Alcohol use: Not on file      PMH/PSH/PFX reviewed and non-contributory. Review of Systems:   Comprehensive review of systems performed and negative except for as listed above. Exam:     Visit Vitals  /84 (BP 1 Location: Left upper arm, BP Patient Position: At rest;Lying)   Pulse 76   Temp 98.2 °F (36.8 °C)   Resp 19   Wt 184 lb (83.5 kg)   SpO2 99%        General: Well developed, well nourished. Patient in no apparent distress   Head: Normocephalic, atraumatic, anicteric sclera   Lungs:  Clear to auscultation bilaterally, No wheezes or rubs   Cardiac: Regular rate and rhythm with no murmurs. Abd: Bowel sounds were audible. No tenderness on palpation   Ext: No pedal edema   Skin: No overt signs of rash     Neurological Exam:  Mental Status: Alert and oriented to person place and time Knows president. Speech: Fluent no aphasia or dysarthria. Cranial Nerves:   Intact visual fields. Facial sensation is normal. Facial movement is symmetric. Palate is midline. Normal sternocleidomastoid strength. Tongue is midline. Hearing is intact bilaterally. Eyes: PERRL, EOM's full, no nystagmus, no ptosis. Motor:  Full and symmetric strength of upper and lower proximal and distal muscles. Normal bulk and tone. Reflexes:   Deep tendon reflexes 3+/4 and symmetrical.  Plantar response is downgoing b/l. Sensory:   Symmetrically intact  with no perceived deficits modalities involving small or large fibers. Gait:  Gait is balanced and fluid with normal arm swing. Tremor:   No tremor noted. Cerebellar:  Finger to nose and heel over shin to knee was demonstrated competently. Neurovascular: No carotid bruits.         Imaging  CT Results (maximum last 3):  Results from Hospital Encounter encounter on 10/28/21    CT HEAD WO CONT    Narrative  EXAM: CT HEAD WO CONT    INDICATION: ams    COMPARISON: None. CONTRAST: None. TECHNIQUE: Unenhanced CT of the head was performed using 5 mm images. Brain and  bone windows were generated. Coronal and sagittal reformats. CT dose reduction  was achieved through use of a standardized protocol tailored for this  examination and automatic exposure control for dose modulation. FINDINGS:  The ventricles and sulci are normal in size, shape and configuration. . There is  no significant white matter disease. There is no intracranial hemorrhage,  extra-axial collection, or mass effect. The basilar cisterns are open. No CT  evidence of acute infarct. The bone windows demonstrate no abnormalities. Mucoperiosteal thickening and a  small air-fluid level in the left maxillary sinus. .    Impression  No acute intracranial abnormality. MRI Results (maximum last 3): No results found for this or any previous visit. Lab Review  Lab Results   Component Value Date/Time    WBC 11.1 (H) 10/29/2021 03:36 AM    HCT 37.8 10/29/2021 03:36 AM    HGB 12.9 10/29/2021 03:36 AM    PLATELET 673 91/40/4437 03:36 AM     Lab Results   Component Value Date/Time    Sodium 137 10/29/2021 03:36 AM    Potassium 4.3 10/29/2021 03:36 AM    Chloride 109 (H) 10/29/2021 03:36 AM    CO2 24 10/29/2021 03:36 AM    Glucose 83 10/29/2021 03:36 AM    BUN 8 10/29/2021 03:36 AM    Creatinine 0.61 10/29/2021 03:36 AM    Calcium 9.3 10/29/2021 03:36 AM     No components found for: TROPQUANT  No results found for: HIRO    Signed:  Tip Lomas.  Birgit Deleon DO  10/29/2021  11:20 AM

## 2021-10-29 NOTE — PROGRESS NOTES
T.O.C.    RUR: 6% Low  Disposition: Home pending medical stability   Follow up with PCP  Transport: [facility], [phone]    Reason for Admission:  Amnesia memory loss                      RUR Score:    6%           Plan for utilizing home health:     none     PCP:    First and Last name:  none                 Name of Practice:                Are you a current patient: Yes/No:               Approximate date of last visit:               Can you participate in a virtual visit with your PCP:   Patient does not have a PCP. Stated that she was uninsured for a period of time and has not been to the doctor in a while (years). Will need to be linked to a [address] provider. Current Advanced Directive/Advance Care Plan:      CM met with patient at bedside and spoke to mother Lolis Monae, [phone], over the phone to introduce self and role. Living situation: lives with her boyfriend, Xavier Shah, and his parents in a single family home  ADLs: independent  DME: none  Previous IPR/SNF: none  Previous home health: none  Demographics: confirmed, address and emergency contacts updated. Pharmacy: [facility], unable to recall which location  Main point of contact: Lolis Monae, [phone], and Xavier Shah, [phone]    CM to follow patient progress and assist as recommended with MAGDY plan. Care Management Interventions  PCP Verified by CM: No  Palliative Care Criteria Met (RRAT>21 & CHF Dx)?: No  Mode of Transport at Discharge:  Other (see comment) ([facility])  MyChart Signup: No  Discharge Durable Medical Equipment: No  Health Maintenance Reviewed: Yes  Physical Therapy Consult: No  Occupational Therapy Consult: No  Speech Therapy Consult: No  Support Systems: Spouse/Significant Other, Parent(s)  Confirm Follow Up Transport: Other (see comment) ([facility])  Discharge Location  Discharge Placement: Home    Minna Littlejohn, Master's of Social Work Supervisee

## 2021-10-29 NOTE — PROGRESS NOTES
Admitted from ED to room 651. Pt is only oriented to self and place. VS taken and recorded. No complaints of pain or discomfort at this moment. Reorientation provided. Unable to do admission process, pt was not able to provide answers to almost all the questions. Will contact family member.

## 2021-10-30 VITALS
HEART RATE: 82 BPM | HEIGHT: 62 IN | TEMPERATURE: 98.9 F | OXYGEN SATURATION: 99 % | SYSTOLIC BLOOD PRESSURE: 119 MMHG | BODY MASS INDEX: 33.86 KG/M2 | DIASTOLIC BLOOD PRESSURE: 73 MMHG | RESPIRATION RATE: 15 BRPM | WEIGHT: 184 LBS

## 2021-10-30 PROBLEM — G93.40 ACUTE ENCEPHALOPATHY: Status: RESOLVED | Noted: 2021-10-28 | Resolved: 2021-10-30

## 2021-10-30 PROCEDURE — 95816 EEG AWAKE AND DROWSY: CPT | Performed by: PSYCHIATRY & NEUROLOGY

## 2021-10-30 PROCEDURE — 74011250636 HC RX REV CODE- 250/636: Performed by: INTERNAL MEDICINE

## 2021-10-30 PROCEDURE — 74011250637 HC RX REV CODE- 250/637: Performed by: INTERNAL MEDICINE

## 2021-10-30 RX ORDER — AMOXICILLIN 500 MG/1
500 CAPSULE ORAL EVERY 8 HOURS
Qty: 9 CAPSULE | Refills: 0 | Status: SHIPPED | OUTPATIENT
Start: 2021-10-30 | End: 2021-11-02

## 2021-10-30 RX ORDER — AMOXICILLIN 500 MG/1
500 CAPSULE ORAL EVERY 8 HOURS
Status: DISCONTINUED | OUTPATIENT
Start: 2021-10-30 | End: 2021-10-30 | Stop reason: HOSPADM

## 2021-10-30 RX ORDER — AMOXICILLIN 500 MG/1
500 CAPSULE ORAL EVERY 8 HOURS
Qty: 9 CAPSULE | Refills: 0 | Status: SHIPPED | OUTPATIENT
Start: 2021-10-30 | End: 2021-10-30 | Stop reason: SDUPTHER

## 2021-10-30 RX ADMIN — AMOXICILLIN 500 MG: 500 CAPSULE ORAL at 12:44

## 2021-10-30 RX ADMIN — Medication 10 ML: at 06:00

## 2021-10-30 RX ADMIN — Medication 1 CAPSULE: at 10:38

## 2021-10-30 RX ADMIN — SODIUM CHLORIDE, POTASSIUM CHLORIDE, SODIUM LACTATE AND CALCIUM CHLORIDE 50 ML/HR: 600; 310; 30; 20 INJECTION, SOLUTION INTRAVENOUS at 06:06

## 2021-10-30 NOTE — PROCEDURES
[address]   EEG    Name:  Soren Srinivasan  MR#:  [id number]  :  1990  ACCOUNT #:  [de-identified]  DATE OF SERVICE:  10/29/2021      REQUESTING PHYSICIAN:  Magen Vega DO    HISTORY:  The patient is a 48-year-old female who is being evaluated after an episode of altered mental status that lasted for a couple of days. DESCRIPTION:  This is an 18-channel EEG performed on an awake patient. The dominant posterior background rhythm consists of symmetric well-modulated medium voltage rhythms in the 8-9 Hz frequency range. Faster frequencies are seen in the frontal areas. Drowsiness and sleep states were not recorded. Photic stimulation elicits a symmetric driving response. Hyperventilation was not performed. EEG SUMMARY:  Normal study. CLINICAL INTERPRETATION:  This was a normal EEG during wakeful state of the patient. No lateralizing or epileptiform features were noted.         Liliane Elizalde MD      [id number]  D:  10/30/2021 10:41  T:  10/30/2021 12:45  JOB #:  [id number]

## 2021-10-30 NOTE — DISCHARGE INSTRUCTIONS
Followup with primary care provider. Will need hospital discharge followup visit as outpatient. Can use [facility] that does primary care and urgent care services open 365 days per year and walk-ins welcomed which can be used as the sole pcp or as transition to a traditional practice.

## 2021-10-30 NOTE — ROUTINE PROCESS
Bedside and Verbal shift change report given to ALONDRA Mcfarlane (oncoming nurse) by Paty Velez RN  (offgoing nurse). Report included the following information SBAR, Kardex, ED Summary, Intake/Output, MAR, Recent Results, Cardiac Rhythm Normal Sinus Rhythm, Alarm Parameters  and Quality Measures.

## 2021-10-30 NOTE — DISCHARGE SUMMARY
Discharge Summary       PATIENT ID: Crhis Benton  MRN: [id number]   YOB: 1990    DATE OF ADMISSION: 10/28/2021  3:59 PM    DATE OF DISCHARGE: 10/30/2021  PRIMARY CARE PROVIDER: None     ATTENDING PHYSICIAN:   DISCHARGING PROVIDER: Adrian Moser MD    To contact this individual call [phone] and ask the  to page. If unavailable ask to be transferred the Adult Hospitalist Department. CONSULTATIONS: IP CONSULT TO NEUROLOGY    PROCEDURES/SURGERIES: * No surgery found *    ADMITTING DIAGNOSES & HOSPITAL COURSE:   Admission Summary:   Acute onset of memory loss, positive for thc, pt endorses going to a party eating marijuana. UA suspicious for uti and head CT negative for acute process.           Assessment & Plan:      Acute onset of memory loss, positive for thc, pt endorses going to a party eating marijuana     Amnesia memory loss in setting of recent marjuana  Pt states that some of her memory is coming back now  -positive for THC and uti  -head CT negative for acute process  -mri unrevealing  -evaluated by neurology service and okay for discharge     Mild leukocytosis, trending down  No fever/chills  On abx for uti     Elevated bp on admission, no prior htn history  Improved, need followup with pcp as outpatient     Marijuana use  Pt reports used for recreational use, counselled     Acute cystitis without hematuria. tx with abx                   FOLLOW UP APPOINTMENTS:    Follow-up Information     Follow up With Specialties Details Why Contact Info    None    None ([phone]) Patient stated that they have no PCP      primary care provider  Go to Schedule appointment to see a primary care physician OR can use [facility] that provides primary care and urgent care service open 365 days per year and walk-ins welcomed.               DIET: regular  ACTIVITY: as tolerated      DISCHARGE MEDICATIONS:  Current Discharge Medication List      START taking these medications    Details amoxicillin (AMOXIL) 500 mg capsule Take 1 Capsule by mouth every eight (8) hours for 9 doses. Followup with primary care provider as outpatient  Qty: 9 Capsule, Refills: 0  Start date: 10/30/2021, End date: 11/2/2021               NOTIFY YOUR PHYSICIAN FOR ANY OF THE FOLLOWING:   Fever over 101 degrees for 24 hours. Chest pain, shortness of breath, fever, chills, nausea, vomiting, diarrhea, change in mentation, falling, weakness, bleeding. Severe pain or pain not relieved by medications. Or, any other signs or symptoms that you may have questions about. DISPOSITION:    Home With:   OT  PT  HH  RN       Long term SNF/Inpatient Rehab   x Independent/assisted living    Hospice    Other:       PATIENT CONDITION AT DISCHARGE:     Functional status    Poor     Deconditioned    x Independent      Cognition   x  Lucid     Forgetful     Dementia      Catheters/lines (plus indication)    High     PICC     PEG    x None      Code status   x  Full code     DNR      PHYSICAL EXAMINATION AT DISCHARGE:  General:          Alert, cooperative, no distress, appears stated age. HEENT:           Atraumatic, anicteric sclerae  Neck:               Supple  Lungs:             Clear to auscultation bilaterally. No Wheezing or Rhonchi. No rales. Chest wall:      No tenderness  No Accessory muscle use. Heart:              Regular  rhythm,  No edema  Abdomen:        Soft, non-tender. Not distended. Bowel sounds normal  Extremities:     No cyanosis.  Symmetrical muscle tone, pulses present  Skin:                Not Jaundiced    Psych:             Not anxious or agitated, does not voice si/hi/hallucinations  Neurologic:      Alert, moves all extremities, answers questions appropriately       CHRONIC MEDICAL DIAGNOSES:  Problem List as of 10/30/2021 Date Reviewed: 10/28/2021        Codes Class Noted - Resolved    * (Principal) RESOLVED: Acute encephalopathy ICD-10-CM: G93.40  ICD-9-CM: 348.30  10/28/2021 - 10/30/2021 Greater than 30 minutes were spent with the patient on counseling and coordination of care    Signed:   Ham French MD  10/30/2021  11:28 AM

## 2021-10-30 NOTE — PROGRESS NOTES
Bedside RN performed patient education and medication education. Discharge concerns initiated and discussed with patient, including clarification on \"who\" assists the patient at their home and instructions for when the home going patient should call their provider after discharge. Opportunity for questions and clarification was provided. Writing RN has completed a focused assessment and agrees with the documentation of Flo Ricks LPN.

## 2021-10-31 LAB
BACTERIA SPEC CULT: ABNORMAL
CC UR VC: ABNORMAL
SERVICE CMNT-IMP: ABNORMAL

## 2021-11-11 NOTE — PROGRESS NOTES
Physician Progress Note      PATIENT:               Salima Ramos  CSN #:                  [id number]  :                       1990  ADMIT DATE:       10/28/2021 3:59 PM  [address] DATE:        10/30/2021 2:18 PM  RESPONDING  PROVIDER #:        Jordan Lang MD          QUERY TEXT:    Pt admitted with memory loss and has encephalopathy documented by neuro. Per neuro 'She believes this was edible THC'. If possible, please document in progress notes and discharge summary further specificity regarding the type of encephalopathy:    The medical record reflects the following:  Risk Factors: 32yo admitted for memory loss    Clinical Indicators:  EEG: This was a normal EEG during wakeful state of the patient. No lateralizing or epileptiform features were noted. Neuro consult:  acute encephalopathy x 48 hours which appears improved presently  She believes this was edible [facility]  Toxicology screen appears positive for the same. In addition to above, she has also had some recent stress/anxiety related to relationship/job stressors. Examination appears non-focal presently. We will complete MRI Brain to exclude any acute structural pathology as well as baseline EEG for assessment of epileptiform activity, although I suspect toxic/metabolic encephalopathy which is resolving.     DS:  Acute onset of memory loss, positive for thc, pt endorses going to a party eating marijuana    Amnesia memory loss in setting of recent marjuana  Pt states that some of her memory is coming back now  -positive for [facility] and uti  -head CT negative for acute process  -mri unrevealing  -evaluated by neurology service and okay for discharge    Treatment: Neuro following, drug test, CT head, MRI, EEG    Thank you,  [facility]  [phone]  Options provided:  -- Drug-induced encephalopathy due to cannabis use  -- Drug-induced encephalopathy due to cannabis poisoning  -- Toxic encephalopathy due to cannabis use  -- Toxic encephalopathy due to cannabis poisoning  -- Other - I will add my own diagnosis  -- Disagree - Not applicable / Not valid  -- Disagree - Clinically unable to determine / Unknown  -- Refer to Clinical Documentation Reviewer    PROVIDER RESPONSE TEXT:    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     Query created by: Cherise Oscar on 11/2/2021 1:53 PM      Electronically signed by:  Emeli Carranza MD 11/11/2021 9:16 AM